# Patient Record
Sex: FEMALE | NOT HISPANIC OR LATINO | Employment: FULL TIME | URBAN - METROPOLITAN AREA
[De-identification: names, ages, dates, MRNs, and addresses within clinical notes are randomized per-mention and may not be internally consistent; named-entity substitution may affect disease eponyms.]

---

## 2020-09-18 ENCOUNTER — TRANSCRIBE ORDERS (OUTPATIENT)
Dept: ADMINISTRATIVE | Facility: HOSPITAL | Age: 55
End: 2020-09-18

## 2020-09-18 DIAGNOSIS — K59.01 SLOW TRANSIT CONSTIPATION: Primary | ICD-10-CM

## 2020-09-24 ENCOUNTER — TRANSCRIBE ORDERS (OUTPATIENT)
Dept: PULMONOLOGY | Facility: HOSPITAL | Age: 55
End: 2020-09-24

## 2020-09-24 ENCOUNTER — HOSPITAL ENCOUNTER (OUTPATIENT)
Dept: GENERAL RADIOLOGY | Facility: HOSPITAL | Age: 55
Discharge: HOME | End: 2020-09-24
Admitting: COLON & RECTAL SURGERY

## 2020-09-24 DIAGNOSIS — K59.01 SLOW TRANSIT CONSTIPATION: ICD-10-CM

## 2020-09-24 DIAGNOSIS — G47.33 OBSTRUCTIVE SLEEP APNEA (ADULT) (PEDIATRIC): Primary | ICD-10-CM

## 2020-09-24 PROCEDURE — 0 DIATRIZOATE MEGLUMINE & SODIUM PER 1 ML: Performed by: COLON & RECTAL SURGERY

## 2020-09-24 PROCEDURE — 74270 X-RAY XM COLON 1CNTRST STD: CPT

## 2020-09-24 RX ADMIN — DIATRIZOATE MEGLUMINE AND DIATRIZOATE SODIUM 480 ML: 660; 100 LIQUID ORAL; RECTAL at 13:21

## 2020-10-22 ENCOUNTER — HOSPITAL ENCOUNTER (OUTPATIENT)
Dept: SLEEP MEDICINE | Facility: HOSPITAL | Age: 55
Discharge: HOME | End: 2020-10-22
Admitting: INTERNAL MEDICINE

## 2020-10-22 VITALS — HEIGHT: 62 IN | BODY MASS INDEX: 29.44 KG/M2 | WEIGHT: 160 LBS

## 2020-10-22 DIAGNOSIS — G47.33 OBSTRUCTIVE SLEEP APNEA (ADULT) (PEDIATRIC): ICD-10-CM

## 2020-10-22 PROCEDURE — 95800 SLP STDY UNATTENDED: CPT

## 2024-05-08 ENCOUNTER — TRANSCRIBE ORDERS (OUTPATIENT)
Age: 59
End: 2024-05-08
Payer: COMMERCIAL

## 2024-05-08 DIAGNOSIS — M79.7 FIBROMYALGIA: Primary | ICD-10-CM

## 2024-05-08 DIAGNOSIS — M79.10 MUSCLE PAIN: ICD-10-CM

## 2024-05-08 DIAGNOSIS — M25.50 ARTHRALGIA, UNSPECIFIED JOINT: ICD-10-CM

## 2024-05-29 PROBLEM — M79.7 FIBROMYALGIA: Status: ACTIVE | Noted: 2024-05-29

## 2024-05-30 ENCOUNTER — OFFICE VISIT (OUTPATIENT)
Age: 59
End: 2024-05-30
Payer: COMMERCIAL

## 2024-05-30 VITALS
SYSTOLIC BLOOD PRESSURE: 120 MMHG | HEART RATE: 78 BPM | WEIGHT: 190.3 LBS | BODY MASS INDEX: 35.02 KG/M2 | HEIGHT: 62 IN | TEMPERATURE: 98.6 F | DIASTOLIC BLOOD PRESSURE: 88 MMHG

## 2024-05-30 DIAGNOSIS — M54.50 CHRONIC MIDLINE LOW BACK PAIN WITHOUT SCIATICA: ICD-10-CM

## 2024-05-30 DIAGNOSIS — E53.8 VITAMIN B 12 DEFICIENCY: ICD-10-CM

## 2024-05-30 DIAGNOSIS — I73.00 RAYNAUD'S DISEASE WITHOUT GANGRENE: ICD-10-CM

## 2024-05-30 DIAGNOSIS — M35.00 SJOGREN'S SYNDROME, WITH UNSPECIFIED ORGAN INVOLVEMENT: ICD-10-CM

## 2024-05-30 DIAGNOSIS — G89.29 CHRONIC MIDLINE LOW BACK PAIN WITHOUT SCIATICA: ICD-10-CM

## 2024-05-30 DIAGNOSIS — M79.7 FIBROMYALGIA: Primary | ICD-10-CM

## 2024-05-30 DIAGNOSIS — M25.50 ARTHRALGIA, UNSPECIFIED JOINT: ICD-10-CM

## 2024-05-30 DIAGNOSIS — E55.9 VITAMIN D DEFICIENCY: ICD-10-CM

## 2024-05-30 RX ORDER — VALACYCLOVIR HYDROCHLORIDE 500 MG/1
1000 TABLET, FILM COATED ORAL 2 TIMES DAILY
COMMUNITY

## 2024-05-30 RX ORDER — LOSARTAN POTASSIUM 100 MG/1
100 TABLET ORAL DAILY
COMMUNITY

## 2024-05-30 RX ORDER — CLONIDINE HYDROCHLORIDE 0.1 MG/1
0.1 TABLET ORAL AS NEEDED
COMMUNITY

## 2024-05-30 RX ORDER — HYDROCORTISONE ACETATE 25 MG/1
25 SUPPOSITORY RECTAL AS NEEDED
COMMUNITY

## 2024-05-30 RX ORDER — FAMOTIDINE 40 MG/1
40 TABLET, FILM COATED ORAL DAILY
COMMUNITY

## 2024-05-30 NOTE — ASSESSMENT & PLAN NOTE
Diagnosed in 2015 [facility]  Positive Fatigue. Vitamin B-12 Dificiency.   Diffused Muscle Disease  Sleep- 4-5 hours per night, she has trouble falling asleep and staying asleep. Sleep Apnea Evaluation was negative (2020 watch test). She does wake herself gasping for air and snoring, snoring and snores severely.  Medications tried, Paxil, Prozac, Zoloft, Cevela, Lexapro, Effexor,Wellbutrin,Cymbalta naproxen, meloxicam, Celebrex, diclofenac, Piroxicam, etodolac, amitriptyline, trazodone, flexeril, tizanidine ,baclofen, tramadol, lyrica (caused side effects), Gabapentin (swelling).  She walks on lunch, stretches and uses the elliptical for exercise.   Chronic Muscle Pain, Fatigue, Poor Sleep. Significant Symptoms consistent with Sleep Apnea. Pt. Has associated IBS, Pelvic Pain and Interstitial Cystitis      Plan:    Sleep Clinic Evaluation  Four Cornerstones of treatment include:  1 - Treat sleep - restoration  2 - Exercise - chair aerobics or chair yoga - You tube, Water aerobics  3 - Treat anxiety and depression  4 - Treat pain. Non addictive muscle relaxer Biofreeze massage, heat, ice   Symptoms can be worsened when thyroid is underactive. B12 and D are low. This was the case in 12/24.  CBC/CMP, CK, TSH

## 2024-05-30 NOTE — ASSESSMENT & PLAN NOTE
She does not use NSAIDS due to her gastritis. She does have some symptoms that can be concerning with Inflammatory Arthritis, MCP joints, can be related to Sjogren's. However would revaluated Lupus and Rheumatoid, Sjorgren's can be associated with either.     Plan:  RF3, CCP, Sed Rate, CRP  Restart Plaquneil, she has been cleared for this previously.  Take 1 per day for 3 weeks, then increase to 1 tab twice daily   Tylenol Arthritis 2 tablets twice dailys as needed - [employer] generic or BRAND

## 2024-05-30 NOTE — ASSESSMENT & PLAN NOTE
Primarily blue color changes.    Plan  Conservative Measures- gloves and socks, hand warmers.   Calcium Channel Blockers- at this point symptoms are occasional and resolve with warming. Will observe.

## 2024-05-30 NOTE — ASSESSMENT & PLAN NOTE
With rediucular symptoms in left leg.  Worsening low back pain, X 1 year. Radiates to left foot. Positive numbness. Mild decreased in quad strength. Absent reflex, left knee.       Plan:  MRI of the Lumbar Spine, with Ref to Neuro Surgery in the future.

## 2024-05-30 NOTE — PROGRESS NOTES
[facility] Office Visit     Office Visit       Date: 05/30/2024   Patient Name: Hilary Oh  MRN: [id number]  YOB: 1965    Referring Physician: David Valles,*     Chief Complaint   Patient presents with    sjogrens    Fibromyalgia       History of Present Illness: Hilary Oh is a 58 y.o. female who is here today for evaluation of Fibromyalgia, muscle pain, and joint pain. The patient was sent by Dr. David Valles. Per the pt.'s intake form, she has difficulty walking up a flight of stairs and cleaning her house. Her pain level today is 5/10.    Per patient she was diagnosed with Sjogren's Syndrome via lip bioposy.   She was seen by us in Sept and Oct of 2020.   1.) Fibromyalgia -diagnosed in 2015 at [facility]. Positive Fatigue. Vitamin B-12 Dificiency.   Diffused Muscle Disease  Sleep- 4-5 hours per night, she has trouble falling asleep and staying asleep. Sleep Apnea Evaluation was negative (2020 watch test). She does wake herself gasping for air and snoring, snoring and snores severely.  Medications tried, Paxil, Prozac, Zoloft, Cevela, Lexapro, Effexor,Wellbutrin,Cymbalta naproxen, meloxicam, Celebrex, diclofenac, Piroxicam, etodolac, amitriptyline, trazodone, flexeril, tizanidine ,baclofen, tramadol, lyrica (caused side effects), Gabapentin (swelling).  She walks on lunch, stretches and uses the elliptical for exercise.   2.) Sjogren's - DX by lip biopsy in 2015, Positive MARK 1:640 H, Neg SSA/SSB, Positive Dry eyes, mouth and vaginal dryness. She uses Xylimelts and Biotene.  3.) Raynauds- blue discoloration of finger tips when cold. Denies the classic 3 color change.   4.) Joint Pain: Knees, MCP pain, 1 hour morning stiffness. The knees ache. Knees and MCP's swell.  She's never tried Plaquneil.  No diagnoses of psoriasis, IBS, Chlamydia  food poisoning. Denies blood transfusions. No tattoos    She's been diagnosed with  IBS. Occasional blurred vision and pulsating of the eyes.     Lupus Symptoms:   Denies : Pleurisy, Sicca, Synovitis   Positive for painful nasal and oral uclers often. She gets a flushing type rash in the sun that burns.       Result Review :        Data reviewed : PCP notes.         Subjective     Allergies   Allergen Reactions    Amitriptyline Hcl Provider Review Needed    Bupropion Hcl [Bupropion] Provider Review Needed    Ezetimibe Provider Review Needed    Sumatriptan Provider Review Needed         Current Outpatient Medications:     albuterol sulfate  (90 Base) MCG/ACT inhaler, Inhale 2 puffs Every 4 (Four) Hours As Needed for Wheezing., Disp: , Rfl:     ALPRAZolam (XANAX) 0.5 MG tablet, Take 1 tablet by mouth 2 (Two) Times a Day As Needed for Anxiety. Take 1 tablet in the am and 2 tablets at bedtime, Disp: , Rfl:     cloNIDine (CATAPRES) 0.1 MG tablet, Take 1 tablet by mouth As Needed for High Blood Pressure., Disp: , Rfl:     Cyanocobalamin (B-12 Compliance Injection) 1000 MCG/ML kit, Inject  as directed Every 30 (Thirty) Days. Every month, Disp: , Rfl:     estradiol (CLIMARA) 0.1 MG/24HR patch, Place 1 patch on the skin as directed by provider 1 (One) Time Per Week. 2 times every week, Disp: , Rfl:     famotidine (PEPCID) 40 MG tablet, Take 1 tablet by mouth Daily., Disp: , Rfl:     hydrocortisone (Anucort-HC) 25 MG suppository, Insert 1 suppository into the rectum As Needed for Hemorrhoids., Disp: , Rfl:     levothyroxine sodium (Tirosint) 75 MCG capsule, Take 1 capsule by mouth Daily., Disp: , Rfl:     losartan (COZAAR) 100 MG tablet, Take 1 tablet by mouth Daily., Disp: , Rfl:     montelukast (SINGULAIR) 10 MG tablet, Take 1 tablet by mouth. Take 1 tablet by oral route every day in the evening  1 tablet in the am and 2 at bedtime, Disp: , Rfl:     nystatin (MYCOSTATIN) 856429 UNIT/GM cream, Apply 1 Application topically to the appropriate area as directed 2 (Two) Times a Day., Disp: , Rfl:      omeprazole (priLOSEC) 40 MG capsule, Take 1 capsule by mouth Daily. Before a meal, Disp: , Rfl:     phenazopyridine (PYRIDIUM) 200 MG tablet, Take 1 tablet by mouth 3 (Three) Times a Day As Needed for Bladder Spasms. After meals as needed, Disp: , Rfl:     valACYclovir (VALTREX) 500 MG tablet, Take 2 tablets by mouth 2 (Two) Times a Day., Disp: , Rfl:     Vitamin D, Ergocalciferol, 06948 units capsule, Take 1 capsule by mouth 1 (One) Time Per Week., Disp: , Rfl:     Past Medical History:   Diagnosis Date    Acne     Allergic rhinitis     Altered bowel habits     Anemia     Pernicious/ B-12    Anxiety     Arthritis     Asthma     Burn of pharynx     Burning mouth syndrome     Chest pain     Negative Eval 2017    Chronic chest pain     Chronic constipation     Chronic cough     Chronic fatigue syndrome     Chronic idiopathic constipation     2020    Chronic interstitial cystitis     DDD (degenerative disc disease), cervical     DDD (degenerative disc disease), lumbar     Diverticulosis     Dyspnea     Fatty liver     Fibromyalgia     Gastritis     GERD (gastroesophageal reflux disease)     Headache, migraine     Hemorrhoid     Hyperlipidemia     Hypothyroidism     Insomnia     Internal hemorrhoids     Irritable bowel disease     Keratoconjunctivitis sicca, in Sjogren's syndrome     Kidney stones     Loss of hair     Meralgia paresthetica, left lower limb     2022    Microscopic hematuria     Mood disorder     Musculoskeletal abnormal finding on examination     Neck pain     Night sweats     Ovarian cyst     Paresthesia     Peripheral neuropathy     Pilar cyst     Rectocele     Incomplete Uterovaginal Prolapse 2024    Shoulder impingement     Left    Sjogren's syndrome     Tinnitus     Ureteral stricture     Dilation and Stent 2013    Urethral syndrome     Varicose vein of leg     Vitamin B 12 deficiency     Vitamin D deficiency     Xerostomia         Past Surgical History:   Procedure Laterality Date    BREAST BIOPSY       COLONOSCOPY      HYSTERECTOMY      Still Has Overies 2006    LABIAPLASTY      2016    RADICAL HYSTERECTOMY      ROTATOR CUFF REPAIR Right     Bicep    TUBAL ABDOMINAL LIGATION         Family History   Problem Relation Age of Onset    Arthritis Mother     Osteoporosis Mother     Cervical cancer Mother     Depression Mother     Lung cancer Mother     Heart attack Father     Hypertension Father     Hyperlipidemia Father     Heart disease Father     Diabetes Sister         Social History     Socioeconomic History    Marital status:    Tobacco Use    Smoking status: Never     Passive exposure: Past    Smokeless tobacco: Never   Substance and Sexual Activity    Alcohol use: Not Currently    Drug use: Never    Sexual activity: Defer       Review of Systems   Constitutional:  Positive for chills, fatigue and unexpected weight gain. Negative for fever.        Night sweats   HENT:  Positive for mouth sores, rhinorrhea, tinnitus and trouble swallowing. Negative for nosebleeds and swollen glands.         Dry mouth, dry eyes, jaw pain, nasal sores, red eyes, sore throat   Eyes:  Positive for blurred vision and pain. Negative for double vision, photophobia and visual disturbance.   Respiratory:  Positive for shortness of breath and wheezing. Negative for apnea, cough and choking.    Cardiovascular:  Positive for palpitations and leg swelling. Negative for chest pain.        Leg cramping, raynauds, varicose veins    Gastrointestinal:  Positive for abdominal pain, blood in stool, constipation, nausea and GERD. Negative for diarrhea and vomiting.        Abdominal cramping, bloating, early satiety, hemorrhoids, epigastric pain   Endocrine: Positive for cold intolerance, heat intolerance, polydipsia and polyuria. Negative for polyphagia.        Nocturia, pelvic pain, hair loss, facial hair, hot flashes    Genitourinary:  Positive for pelvic pain and urinary incontinence. Negative for difficulty urinating, dysuria, genital  "sores and hematuria.        Kidney stones   Musculoskeletal:  Positive for back pain, joint swelling, myalgias, neck pain and neck stiffness. Negative for arthralgias, gait problem and bursitis.        Joint pain, joint tenderness, low back pain, muscle cramping, morning stiffness    Skin:  Positive for rash (facial lupus rash) and bruise.        Hives, gabriela changes, scalp tenderness    Allergic/Immunologic: Negative for environmental allergies and food allergies.   Neurological:  Positive for dizziness, weakness, numbness, headache and memory problem. Negative for tremors, seizures, syncope and confusion.        Tingling    Hematological:  Negative for adenopathy. Bruises/bleeds easily.   Psychiatric/Behavioral:  Positive for sleep disturbance. Negative for suicidal ideas, depressed mood and stress. The patient is nervous/anxious.         I reviewed the patient's chief complaint, history of present illness, review of systems, past medical history, surgical history, family history, social history, medications and allergy list.     Objective    Vital Signs:   Vitals:    05/30/24 0914   BP: 120/88   Pulse: 78   Temp: 98.6 °F (37 °C)   Weight: 86.3 kg (190 lb 4.8 oz)   Height: 157.5 cm (62\")     Hilary Oh reports a pain score of .  Given her pain assessment as noted, treatment options were discussed and the following options were decided upon as a follow-up plan to address the patient's pain: continuation of current treatment plan for pain.      Body mass index is 34.81 kg/m².   BMI cannot be calculated due to outdated height or weight values.  Please input a current height/weight in Vitals and re-renter BMIFOLLOWUP in Note to pull in correct documentation based on BMI range.         Physical Exam  Constitutional:       General: She is not in acute distress.     Appearance: She is not ill-appearing, toxic-appearing or diaphoretic.   HENT:      Head:      Comments: Tongue dry      Right Ear: External ear " normal.      Left Ear: External ear normal.      Nose: Nose normal. No congestion.      Mouth/Throat:      Pharynx: Oropharynx is clear. No oropharyngeal exudate or posterior oropharyngeal erythema.   Eyes:      Extraocular Movements: Extraocular movements intact.      Conjunctiva/sclera: Conjunctivae normal.      Pupils: Pupils are equal, round, and reactive to light.   Neck:      Comments: Mild decreased ROM in the neck.   Cardiovascular:      Rate and Rhythm: Normal rate and regular rhythm.      Pulses: Normal pulses.      Heart sounds: Normal heart sounds.   Pulmonary:      Effort: Pulmonary effort is normal.      Breath sounds: Normal breath sounds.   Abdominal:      General: Abdomen is flat. Bowel sounds are normal.      Palpations: Abdomen is soft.   Musculoskeletal:         General: Normal range of motion.      Right shoulder: Tenderness present.      Left shoulder: Tenderness present.      Comments: FMS 18/18 tender points positive.     Tender in Lumbar and Thoracic    MCPs, PIPs Tender    Strength 4/5 left leg   Skin:     General: Skin is warm and dry.      Capillary Refill: Capillary refill takes less than 2 seconds.   Neurological:      General: No focal deficit present.      Mental Status: She is oriented to person, place, and time.      Cranial Nerves: No cranial nerve deficit.      Motor: No weakness.      Deep Tendon Reflexes: Reflexes normal.   Psychiatric:         Mood and Affect: Mood normal.         Behavior: Behavior normal.         Thought Content: Thought content normal.            Physical Exam  There is currently no information documented on the homunculus. Go to the Rheumatology activity and complete the homunculus joint exam.       Results Review:   Imaging Results (Last 24 Hours)       ** No results found for the last 24 hours. **            Procedures    Assessment / Plan    Assessment/Plan:   Diagnoses and all orders for this visit:    1. Fibromyalgia (Primary)  Assessment &  Plan:  Diagnosed in 2015 [facility]  Positive Fatigue. Vitamin B-12 Dificiency.   Diffused Muscle Disease  Sleep- 4-5 hours per night, she has trouble falling asleep and staying asleep. Sleep Apnea Evaluation was negative (2020 watch test). She does wake herself gasping for air and snoring, snoring and snores severely.  Medications tried, Paxil, Prozac, Zoloft, Cevela, Lexapro, Effexor,Wellbutrin,Cymbalta naproxen, meloxicam, Celebrex, diclofenac, Piroxicam, etodolac, amitriptyline, trazodone, flexeril, tizanidine ,baclofen, tramadol, lyrica (caused side effects), Gabapentin (swelling).  She walks on lunch, stretches and uses the elliptical for exercise.   Chronic Muscle Pain, Fatigue, Poor Sleep. Significant Symptoms consistent with Sleep Apnea. Pt. Has associated IBS, Pelvic Pain and Interstitial Cystitis      Plan:    Sleep Clinic Evaluation  Four Cornerstones of treatment include:  1 - Treat sleep - restoration  2 - Exercise - chair aerobics or chair yoga - You tube, Water aerobics  3 - Treat anxiety and depression  4 - Treat pain. Non addictive muscle relaxer Biofreeze massage, heat, ice   Symptoms can be worsened when thyroid is underactive. B12 and D are low. This was the case in 12/24.  CBC/CMP, CK, TSH      2. Sjogren's syndrome, with unspecified organ involvement  Assessment & Plan:  DX by lip biopsy in 2015, Positive MARK 1:640 H, Neg SSA/SSB, Positive Dry eyes, mouth and vaginal dryness. She uses Xylimelts and Biotene.  She cannot take Pilocarpine of Cevelimine due to Asthma.    Plan:  ACT or Biotene toothpaste  Xylimelts at bedtime. Oasis Spray-[employer]. Drink plenty of fluids. Regular Dental and Eye Exams.   MARK by IFA, SSA/SSB, Chappell, RNP, Crithidia DNA    Orders:  -     MARK by IFA, Reflex 9-biomarkers profile; Future  -     CBC & Differential; Future  -     Comprehensive Metabolic Panel; Future  -     CK; Future  -     XR Spine Lumbar 2 or 3 View; Future  -     MRI Lumbar Spine Without Contrast;  Future  -     Sedimentation Rate; Future  -     C-reactive Protein; Future  -     Sjogren's Antibody, Anti-SS-A / -SS-B; Future  -     TSH; Future  -     Anti-Smith Antibody; Future  -     Cyclic Citrul Peptide Antibody, IgG / IgA; Future  -     Anti-U1 RNP Ab (RDL); Future  -     dsDNA Antibody by IFA, Crithidia luciliae, with Reflex to Titer; Future  -     RF Isotypes, IgG, IgA, IgM EIA; Future    3. Raynaud's disease without gangrene  Assessment & Plan:  Primarily blue color changes.    Plan  Conservative Measures- gloves and socks, hand warmers.   Calcium Channel Blockers- at this point symptoms are occasional and resolve with warming. Will observe.     Orders:  -     MARK by IFA, Reflex 9-biomarkers profile; Future  -     CBC & Differential; Future  -     Comprehensive Metabolic Panel; Future  -     CK; Future  -     XR Spine Lumbar 2 or 3 View; Future  -     MRI Lumbar Spine Without Contrast; Future  -     Sedimentation Rate; Future  -     C-reactive Protein; Future  -     Sjogren's Antibody, Anti-SS-A / -SS-B; Future  -     TSH; Future  -     Anti-Smith Antibody; Future  -     Cyclic Citrul Peptide Antibody, IgG / IgA; Future  -     Anti-U1 RNP Ab (RDL); Future  -     dsDNA Antibody by IFA, Crithidia luciliae, with Reflex to Titer; Future  -     RF Isotypes, IgG, IgA, IgM EIA; Future    4. Arthralgia, unspecified joint  Assessment & Plan:  She does not use NSAIDS due to her gastritis. She does have some symptoms that can be concerning with Inflammatory Arthritis, MCP joints, can be related to Sjogren's. However would revaluated Lupus and Rheumatoid, Sjorgren's can be associated with either.     Plan:  RF3, CCP, Sed Rate, CRP  Restart Plaquneil, she has been cleared for this previously.  Take 1 per day for 3 weeks, then increase to 1 tab twice daily   Tylenol Arthritis 2 tablets twice dailys as needed - [employer] generic or BRAND        Orders:  -     MARK by IFA, Reflex 9-biomarkers profile; Future  -     CBC &  Differential; Future  -     Comprehensive Metabolic Panel; Future  -     CK; Future  -     XR Spine Lumbar 2 or 3 View; Future  -     MRI Lumbar Spine Without Contrast; Future  -     Sedimentation Rate; Future  -     C-reactive Protein; Future  -     Sjogren's Antibody, Anti-SS-A / -SS-B; Future  -     TSH; Future  -     Anti-Smith Antibody; Future  -     Cyclic Citrul Peptide Antibody, IgG / IgA; Future  -     Anti-U1 RNP Ab (RDL); Future  -     dsDNA Antibody by IFA, Crithidia luciliae, with Reflex to Titer; Future  -     RF Isotypes, IgG, IgA, IgM EIA; Future    5. Vitamin D deficiency  Assessment & Plan:  She takes 50,000iu once weekly    Plan  Check Vitamin D today.in view of Fibro and fatigue       6. Vitamin B 12 deficiency  Assessment & Plan:  Can contribute to Fatigue, which is also seen in fibro.    Plan  Check B12 today      7. Chronic midline low back pain without sciatica  Assessment & Plan:  With rediucular symptoms in left leg.  Worsening low back pain, X 1 year. Radiates to left foot. Positive numbness. Mild decreased in quad strength. Absent reflex, left knee.               I spent 60 minutes caring for Hilary on this date of service. This time includes time spent by me in the following activities: Complicated patient, with multiple medical issues, complicated PMH. Greater than 50% discussion with patient regarding reevaluation of CTD, treatment plan with medication and assessment of her low back pain with radicular symptoms.     Follow Up:   Return in about 8 weeks (around 7/25/2024).      Scribed for Ce Mazariegos MD by Greer Justice RN. 5/30/2024  10:56 EDT      Ce Mazariegos MD  [facility] [place] [facility]

## 2024-05-30 NOTE — ASSESSMENT & PLAN NOTE
DX by lip biopsy in 2015, Positive MARK 1:640 H, Neg SSA/SSB, Positive Dry eyes, mouth and vaginal dryness. She uses Xylimelts and Biotene.  She cannot take Pilocarpine of Cevelimine due to Asthma.    Plan:  ACT or Biotene toothpaste  Xylimelts at bedtime. Oasis Spray-[employer]. Drink plenty of fluids. Regular Dental and Eye Exams.   MARK by IFA, SSA/SSB, JOSE F Chappell, Ryland DNA

## 2024-08-02 ENCOUNTER — OFFICE VISIT (OUTPATIENT)
Age: 59
End: 2024-08-02
Payer: COMMERCIAL

## 2024-08-02 VITALS
DIASTOLIC BLOOD PRESSURE: 82 MMHG | HEART RATE: 72 BPM | TEMPERATURE: 98.1 F | BODY MASS INDEX: 34.82 KG/M2 | HEIGHT: 62 IN | SYSTOLIC BLOOD PRESSURE: 140 MMHG | WEIGHT: 189.2 LBS

## 2024-08-02 DIAGNOSIS — E53.8 VITAMIN B 12 DEFICIENCY: ICD-10-CM

## 2024-08-02 DIAGNOSIS — R10.9 ABDOMINAL PAIN, UNSPECIFIED ABDOMINAL LOCATION: ICD-10-CM

## 2024-08-02 DIAGNOSIS — R53.83 OTHER FATIGUE: ICD-10-CM

## 2024-08-02 DIAGNOSIS — M25.50 ARTHRALGIA, UNSPECIFIED JOINT: ICD-10-CM

## 2024-08-02 DIAGNOSIS — M79.7 FIBROMYALGIA: ICD-10-CM

## 2024-08-02 DIAGNOSIS — G89.29 CHRONIC MIDLINE LOW BACK PAIN WITH LEFT-SIDED SCIATICA: ICD-10-CM

## 2024-08-02 DIAGNOSIS — L65.9 HAIR LOSS: ICD-10-CM

## 2024-08-02 DIAGNOSIS — R76.8 POSITIVE ANA (ANTINUCLEAR ANTIBODY): ICD-10-CM

## 2024-08-02 DIAGNOSIS — Z79.899 HIGH RISK MEDICATION USE: ICD-10-CM

## 2024-08-02 DIAGNOSIS — M54.42 CHRONIC MIDLINE LOW BACK PAIN WITH LEFT-SIDED SCIATICA: ICD-10-CM

## 2024-08-02 DIAGNOSIS — M35.00 SJOGREN'S SYNDROME, WITH UNSPECIFIED ORGAN INVOLVEMENT: Primary | ICD-10-CM

## 2024-08-02 RX ORDER — PROMETHAZINE HYDROCHLORIDE 12.5 MG/1
12.5 TABLET ORAL EVERY 6 HOURS PRN
COMMUNITY
Start: 2024-06-07

## 2024-08-02 RX ORDER — HYDROXYCHLOROQUINE SULFATE 200 MG/1
200 TABLET, FILM COATED ORAL 2 TIMES DAILY
Qty: 60 TABLET | Refills: 4 | Status: SHIPPED | OUTPATIENT
Start: 2024-08-02 | End: 2025-03-13

## 2024-08-02 RX ORDER — METHOCARBAMOL 750 MG/1
TABLET, FILM COATED ORAL
Qty: 90 TABLET | Refills: 3 | Status: SHIPPED | OUTPATIENT
Start: 2024-08-02 | End: 2025-03-13

## 2024-08-02 RX ORDER — THYROID,PORK 60 MG
60 TABLET ORAL DAILY
COMMUNITY
Start: 2024-06-27

## 2024-08-02 RX ORDER — CEVIMELINE HYDROCHLORIDE 30 MG/1
30 CAPSULE ORAL 3 TIMES DAILY
Qty: 90 CAPSULE | Refills: 3 | Status: SHIPPED | OUTPATIENT
Start: 2024-08-02 | End: 2025-03-13

## 2024-08-02 NOTE — ASSESSMENT & PLAN NOTE
S/p Paxil, Prozac, Zoloft, Celexa, Lexapro, Effexor, Wellbutrin, Cymbalta. Paxil may have helped some.  S/p Naproxen, meloxicam, celebrex, diclofenac, Feldene, Etodolac, amitriptyline, trazodone.  S/p flexeril, tizanidine, baclofen, Tramadol- no relief. S/p Lyrica - SE's, Gabapentin - swelling.  She has been diagnosed with FMS for quite some time.  FMS packet given 9/10/2020 for her to read and take to pcp for treatment plan.  Patient saw Dr. Horta for sleep apnea eval.  FMS sx are likely worsened secondary to hypothyroidism and Vitamin Deficiency both which need to be treated.      Plan:  Treat underlying anxiety and depression , pain , exercise and sleep restoration.  Exercise - water aerobics or walking, Chair yoga or chair aerobics on YouTube. Muscle relaxer such as Robaxin 500mg three times daily as needed.  Heat ice or massage.  Biofreeze, CBD oil or lotion as topical cream options.  Pt is an option if needed.

## 2024-08-02 NOTE — ASSESSMENT & PLAN NOTE
Diffuse joint pain with pip pain and swelling.   AM stiffness is 2 hours.  Rf3, CCP negative.  Part of her joint discomfort could be worsened by Hypothyroidism.    Plan:      Voltaren gel to joints if needed.  Plaquenil trial for joints - discussed , brochure given.

## 2024-08-02 NOTE — ASSESSMENT & PLAN NOTE
Diagnosed by lip biopsy 2015 - Path reports states although not definite features of Sjogren's, could represent early changes of Sjogren's in the right clinical setting.  Positive Deepali 1:640 (H)  Positive dry eye, vagina and mouth.  Negative Ssa/Ssb in past.    Plan:    Repeat Ssa/ssb; Deepali by IFA, Cbc, Cmp, UA.  ACT or Biotene toothpaste.  Xylimelts are an option for dry mouth.  Harwick spray from [employer].  There are two prescription meds that can treat this but with her history of Asthma I would avoid.  Continue OTX drops.

## 2024-08-02 NOTE — ASSESSMENT & PLAN NOTE
Plaquenil discussed - brochure given.    Plan:  Eye exam Q 9-12 months with annual OCT. Would need eye clearance before starting.

## 2024-08-02 NOTE — ASSESSMENT & PLAN NOTE
Etiology unknown at present.  Appt pending with derm.  Positive Hypothyroidism.    Plan:    Biotin vitamin.  Nioxin shampoo.  Treat Thyroid with pcp.

## 2024-08-02 NOTE — ASSESSMENT & PLAN NOTE
23.7 in 9/2020  Treating with prescription D.    Plan:    After prescription D I rec she take 2000IU of D3 gel caps daily.

## 2024-08-02 NOTE — ASSESSMENT & PLAN NOTE
Positive bloating, pain , bleeding and constipation.  Seeing Dr. Stacy Lopez.  Celiac negative, IBD negative.    Plan:      Can try elimination trials of certain foods - Gluten, dairy, sugar, night shade etc.  Autoimmune paleo diet.

## 2024-08-02 NOTE — PROGRESS NOTES
[facility] [place] Visit     Office Follow Up      Date: 08/02/2024   Patient Name: Hilary Oh  MRN: [id number]  YOB: 1965    Referring Physician: David Valles,*     Chief Complaint   Patient presents with    Fibromyalgia    Sjogren's syndrome       History of Present Illness: Hilary Oh is a 58 y.o. female who is here today for follow up on   History of Present Illness  The patient presents for evaluation of multiple medical concerns.    The patient reports experiencing severe muscle pain, which disrupts her sleep. She experiences morning stiffness lasting approximately 2 hours. She quantifies her pain as an 8 out of 10, localized primarily on the posterior aspect of her left thigh, which radiates to her foot. She describes the pain as aching and occasionally throbbing, with no associated numbness. She also reports generalized body soreness and pain upon palpation. She also reports pain and swelling in her MCP joints, as well as discomfort in both knees. She has attempted various treatments for her fibromyalgia, including Tylenol, tramadol, Paxil, Prozac, Flexeril, tizanidine, baclofen, and methocarbamol, all of which have not provided relief. She has also tried Tylenol Arthritis, Biofreeze, and Tiger [place]. She has not undergone physical therapy for her back.    The patient has a history of Sjogren's syndrome, which was confirmed by a lip biopsy.    Supplemental Information  She has pernicious anemia. She has mild asthma. She takes vitamin D3 gel capsules every Sunday.    FAMILY HISTORY  Her sister has Sjogren's syndrome.       Result Review :        Results  Laboratory Studies  TSH was 10. Rheumatoid factor 3 was negative. CBC was normal. Sed rate was 6. CMP was normal. CK was normal. C-reactive protein was normal. MARK was positive at 1:320. Secondary testing DNA, Chappell, RNP, SSA, SSB, Sjogren's antibodies, and  scleroderma were negative. CCP was negative. Vitamin D and B12 levels were normal.    Imaging  X-ray of the lower back showed mild degenerative changes with some mild to moderate spurring on the front of the spine.          Subjective     Allergies   Allergen Reactions    Amitriptyline Hcl Provider Review Needed    Bupropion Hcl [Bupropion] Provider Review Needed    Ezetimibe Provider Review Needed    Sumatriptan Provider Review Needed         Current Outpatient Medications:     albuterol sulfate  (90 Base) MCG/ACT inhaler, Inhale 2 puffs Every 4 (Four) Hours As Needed for Wheezing., Disp: , Rfl:     ALPRAZolam (XANAX) 0.5 MG tablet, Take 1 tablet by mouth 2 (Two) Times a Day As Needed for Anxiety. Take 1 tablet in the am and 2 tablets at bedtime, Disp: , Rfl:     Chandler Thyroid 60 MG tablet, Take 1 tablet by mouth Daily., Disp: , Rfl:     cloNIDine (CATAPRES) 0.1 MG tablet, Take 1 tablet by mouth As Needed for High Blood Pressure., Disp: , Rfl:     Cyanocobalamin (B-12 Compliance Injection) 1000 MCG/ML kit, Inject  as directed Every 30 (Thirty) Days. Every month, Disp: , Rfl:     estradiol (CLIMARA) 0.1 MG/24HR patch, Place 1 patch on the skin as directed by provider 1 (One) Time Per Week. 2 times every week, Disp: , Rfl:     famotidine (PEPCID) 40 MG tablet, Take 1 tablet by mouth Daily., Disp: , Rfl:     hydrocortisone (Anucort-HC) 25 MG suppository, Insert 1 suppository into the rectum As Needed for Hemorrhoids., Disp: , Rfl:     losartan (COZAAR) 100 MG tablet, Take 1 tablet by mouth Daily., Disp: , Rfl:     montelukast (SINGULAIR) 10 MG tablet, Take 1 tablet by mouth. Take 1 tablet by oral route every day in the evening  1 tablet in the am and 2 at bedtime, Disp: , Rfl:     nystatin (MYCOSTATIN) 847882 UNIT/GM cream, Apply 1 Application topically to the appropriate area as directed 2 (Two) Times a Day., Disp: , Rfl:     omeprazole (priLOSEC) 40 MG capsule, Take 1 capsule by mouth Daily. Before a meal,  Disp: , Rfl:     phenazopyridine (PYRIDIUM) 200 MG tablet, Take 1 tablet by mouth 3 (Three) Times a Day As Needed for Bladder Spasms. After meals as needed, Disp: , Rfl:     promethazine (PHENERGAN) 12.5 MG tablet, Take 1 tablet by mouth Every 6 (Six) Hours As Needed., Disp: , Rfl:     valACYclovir (VALTREX) 500 MG tablet, Take 2 tablets by mouth 2 (Two) Times a Day., Disp: , Rfl:     Vitamin D, Ergocalciferol, 89089 units capsule, Take 1 capsule by mouth 1 (One) Time Per Week., Disp: , Rfl:     levothyroxine sodium (Tirosint) 75 MCG capsule, Take 1 capsule by mouth Daily., Disp: , Rfl:     Past Medical History:   Diagnosis Date    Acne     Allergic rhinitis     Altered bowel habits     Anemia     Pernicious/ B-12    Anxiety     Arthritis     Asthma     Burn of pharynx     Burning mouth syndrome     Chest pain     Negative Eval 2017    Chronic chest pain     Chronic constipation     Chronic cough     Chronic fatigue syndrome     Chronic idiopathic constipation     2020    Chronic interstitial cystitis     DDD (degenerative disc disease), cervical     DDD (degenerative disc disease), lumbar     Diverticulosis     Dyspnea     Fatty liver     Fibromyalgia     Gastritis     GERD (gastroesophageal reflux disease)     Headache, migraine     Hemorrhoid     Hyperlipidemia     Hypothyroidism     Insomnia     Internal hemorrhoids     Irritable bowel disease     Keratoconjunctivitis sicca, in Sjogren's syndrome     Kidney stones     Loss of hair     Meralgia paresthetica, left lower limb     2022    Microscopic hematuria     Mood disorder     Musculoskeletal abnormal finding on examination     Neck pain     Night sweats     Ovarian cyst     Paresthesia     Peripheral neuropathy     Pilar cyst     Rectocele     Incomplete Uterovaginal Prolapse 2024    Shoulder impingement     Left    Sjogren's syndrome     Tinnitus     Ureteral stricture     Dilation and Stent 2013    Urethral syndrome     Varicose vein of leg     Vitamin B 12  deficiency     Vitamin D deficiency     Xerostomia         Past Surgical History:   Procedure Laterality Date    BREAST BIOPSY      COLONOSCOPY      HYSTERECTOMY      Still Has Overies 2006    LABIAPLASTY      2016    RADICAL HYSTERECTOMY      ROTATOR CUFF REPAIR Right     Bicep    TUBAL ABDOMINAL LIGATION         Family History   Problem Relation Age of Onset    Arthritis Mother     Osteoporosis Mother     Cervical cancer Mother     Depression Mother     Lung cancer Mother     Heart attack Father     Hypertension Father     Hyperlipidemia Father     Heart disease Father     Diabetes Sister         Social History     Socioeconomic History    Marital status:    Tobacco Use    Smoking status: Never     Passive exposure: Past    Smokeless tobacco: Never   Vaping Use    Vaping status: Never Used   Substance and Sexual Activity    Alcohol use: Not Currently    Drug use: Never    Sexual activity: Defer       Review of Systems   Constitutional:  Positive for chills and fatigue.   HENT:  Positive for mouth sores, sore throat and tinnitus.    Eyes:  Positive for blurred vision.   Respiratory:  Positive for shortness of breath.    Cardiovascular:  Positive for chest pain.   Gastrointestinal:  Positive for constipation and nausea.   Musculoskeletal:  Positive for joint swelling, neck pain and neck stiffness.   Skin:  Positive for rash.   Neurological:  Positive for dizziness, weakness, numbness, headache and memory problem.   Hematological:  Bruises/bleeds easily.   Psychiatric/Behavioral:  Positive for sleep disturbance. The patient is nervous/anxious.       I have reviewed and updated the patient's chief complaint, history of present illness, review of systems, past medical history, surgical history, family history, social history, medications and allergy list as appropriate.     Objective    Vital Signs:   Vitals:    08/02/24 1459   BP: 140/82   BP Location: Left arm   Patient Position: Sitting   Cuff Size: Adult  "  Pulse: 72   Temp: 98.1 °F (36.7 °C)   Weight: 85.8 kg (189 lb 3.2 oz)   Height: 157.5 cm (62.01\")   PainSc:   8     Hilary Oh reports a pain score of 8.  Given her pain assessment as noted, treatment options were discussed and the following options were decided upon as a follow-up plan to address the patient's pain: .      Body mass index is 34.6 kg/m².        Physical Exam   Physical Exam  Patient is an alert female, no acute distress.  Head is atraumatic, normocephalic with pupils equal and round. Extraocular muscles intact. Oropharynx is negative except for dry tongue.  Lungs are clear.  Heart rhythm is regular without rubs, gallops, or murmurs.  Cervical spine shows mild to moderate decreased range of motion. Thoracic and lumbar spine are tender. All fibromyalgia syndrome tender points are tender. Shoulders are tender. Right wrist, MCPs and PIPs are tender. Left wrist, MCPs, and PIPs are tender. Knees, ankles, and MTPs are not tender today.  Skin is negative for rash.    Physical Exam  There is currently no information documented on the homunculus. Go to the Rheumatology activity and complete the homunculus joint exam.     Results Review:   Imaging Results (Last 24 Hours)       ** No results found for the last 24 hours. **            Procedures    Assessment / Plan    Assessment/Plan:   Diagnoses and all orders for this visit:    1. Sjogren's syndrome, with unspecified organ involvement (Primary)  Assessment & Plan:  Diagnosed by lip biopsy 2015 - Path reports states although not definite features of Sjogren's, could represent early changes of Sjogren's in the right clinical setting.  Positive Deepali 1:640 (H)  Positive dry eye, vagina and mouth.  Negative Ssa/Ssb in past.    Plan:    Repeat Ssa/ssb; Deepali by IFA, Cbc, Cmp, UA.  ACT or Biotene toothpaste.  Xylimelts are an option for dry mouth.  [place] spray from [employer].  There are two prescription meds that can treat this but with her history of Asthma I " would avoid.  Continue OTX drops.        2. Fibromyalgia  Assessment & Plan:  S/p Paxil, Prozac, Zoloft, Celexa, Lexapro, Effexor, Wellbutrin, Cymbalta. Paxil may have helped some.  S/p Naproxen, meloxicam, celebrex, diclofenac, Feldene, Etodolac, amitriptyline, trazodone.  S/p flexeril, tizanidine, baclofen, Tramadol- no relief. S/p Lyrica - SE's, Gabapentin - swelling.  She has been diagnosed with FMS for quite some time.  FMS packet given 9/10/2020 for her to read and take to pcp for treatment plan.  Patient saw Dr. Horta for sleep apnea eval.  FMS sx are likely worsened secondary to hypothyroidism and Vitamin Deficiency both which need to be treated.      Plan:  Treat underlying anxiety and depression , pain , exercise and sleep restoration.  Exercise - water aerobics or walking, Chair yoga or chair aerobics on YouTube. Muscle relaxer such as Robaxin 500mg three times daily as needed.  Heat ice or massage.  Biofreeze, CBD oil or lotion as topical cream options.  Pt is an option if needed.      3. Other fatigue  Assessment & Plan:  Fatigue likely multifactorial.  Positive snoring and daytime somnolence.  TSH 9.83 consistent with hypothyroidism.  B12 684, D 23.7  Low D and Hypothyroidism can contribute to fatigue as well as Joint pain.    Plan:    Treat D and Thyroid.  Recommend she return to sleep doctor ? Study.      4. Arthralgia, unspecified joint  Assessment & Plan:  Diffuse joint pain with pip pain and swelling.   AM stiffness is 2 hours.  Rf3, CCP negative.  Part of her joint discomfort could be worsened by Hypothyroidism.    Plan:      Voltaren gel to joints if needed.  Plaquenil trial for joints - discussed , brochure given.        5. Abdominal pain, unspecified abdominal location  Assessment & Plan:  Positive bloating, pain , bleeding and constipation.  Seeing Dr. Stacy Lopez.  Celiac negative, IBD negative.    Plan:      Can try elimination trials of certain foods - Gluten, dairy, sugar, night  shade etc.  Autoimmune paleo diet.      6. Hair loss  Assessment & Plan:  Etiology unknown at present.  Appt pending with derm.  Positive Hypothyroidism.    Plan:    Biotin vitamin.  Nioxin shampoo.  Treat Thyroid with pcp.      7. Vitamin B 12 deficiency  Assessment & Plan:  23.7 in 9/2020  Treating with prescription D.    Plan:    After prescription D I rec she take 2000IU of D3 gel caps daily.      8. Positive MARK (antinuclear antibody)  Assessment & Plan:  23.7 in 9/2020  Treating with prescription D.    Plan:    After prescription D I rec she take 2000IU of D3 gel caps daily.      9. High risk medication use  Assessment & Plan:  Plaquenil discussed - brochure given.    Plan:  Eye exam Q 9-12 months with annual OCT. Would need eye clearance before starting.              Assessment & Plan  1. Fibromyalgia.  The patient's muscle pain is likely exacerbated by her hypothyroidism. Her CK test is negative, indicating no inflammatory muscle disease. The sedimentation rate and CRP levels are within the normal range. A prescription for methocarbamol 750, to be taken 0.5 to 1 tablet thrice daily as needed, has been provided. The patient is advised to combine Tylenol Arthritis with methocarbamol at bedtime, starting with 0.5 tablet in the morning, 0.5 tablet in the afternoon, and 1 tablet at bedtime. If necessary, the dosage can be increased to 1 tablet thrice daily. The patient is also advised to engage in chair aerobics or yoga on YouTube.    2. Sjogren's syndrome.  The patient's sister is on sivimilene and has good results with it. She is considering it, but due to her mild asthma, we have discussed caution today about this could potentially worsen asthma. If she has any kind of worsening, she needs to stop it right away, but we will try it 1 tablet 3 times a day.    3. Raynaud's.  Observation is planned until the colder weather begins.    4. Joint pain.  Upon rechecking her labs, her rheumatoid testing was negative.  She has a positive MARK at 1:320. The secondary serologies are negative. The patient is advised to resume Plaquenil, with a 4-month trial. She is to take 1 tablet daily for 3 weeks, then 1 tablet twice daily. Tylenol Arthritis, 2 tablets twice daily as needed, is prescribed.    5. Vitamin D deficiency.  The patient is to continue her 50,000 units of vitamin D weekly. If her vitamin D levels drop significantly, she is advised to take 4000 units of vitamin D daily.    6. B12 deficiency.  The patient is advised to continue her B12 injections.    7. Back pain.  Physical therapy has been ordered.    Follow-up  A follow-up appointment is scheduled for 4 months from now.        Follow Up:   No follow-ups on file.         Ce Mazariegos MD  [facility]     Encounter Administratively Closed by [employer]

## 2024-08-02 NOTE — ASSESSMENT & PLAN NOTE
Fatigue likely multifactorial.  Positive snoring and daytime somnolence.  TSH 9.83 consistent with hypothyroidism.  B12 684, D 23.7  Low D and Hypothyroidism can contribute to fatigue as well as Joint pain.    Plan:    Treat D and Thyroid.  Recommend she return to sleep doctor ? Study.

## 2024-08-27 ENCOUNTER — TELEPHONE (OUTPATIENT)
Age: 59
End: 2024-08-27

## 2024-11-25 ENCOUNTER — TELEPHONE (OUTPATIENT)
Age: 59
End: 2024-11-25
Payer: COMMERCIAL

## 2024-11-25 NOTE — TELEPHONE ENCOUNTER
PT HAD TO BE CANCELLED DUE TO A PROVIDER EMERGENCY. I HAVE REACHED OUT VIA [employer], [employer] AND PHONE. IF PT CALLS BACK TO RESCHEDULE, PLEASE SCHEDULE WITH SHEREEN MALDONADO OR FRANCIS. IF THE PT WANTS TO SEE DR. MEANS, SHE IS BOOKED UNTIL APRIL. IF IT IS A NEW PT, CONFRIM IF THEY ARE INTRESTED IN SEEING ANOTHER PROVIDER OR IF THEY WANT TO WAIT FOR DR. MEANS. PT HAS BEEN ADDED TO THE CANCELLATION LIST AS HIGH PRIORITY.         -HUB READY TO SCHEDULE.

## 2024-12-02 ENCOUNTER — TELEPHONE (OUTPATIENT)
Age: 59
End: 2024-12-02
Payer: COMMERCIAL

## 2024-12-02 DIAGNOSIS — M25.50 ARTHRALGIA, UNSPECIFIED JOINT: ICD-10-CM

## 2024-12-02 DIAGNOSIS — M79.7 FIBROMYALGIA: Primary | ICD-10-CM

## 2024-12-02 NOTE — TELEPHONE ENCOUNTER
PT WOULD LIKE SOMEONE TO REACH HER ABOUT THE MUSCLE RELAXERS SHE IS ON. SHE SAID SHE WOULD LIKE TO GO BACK ON TRAMADOL. HER APPT HAD TO BE CANCELLED AND SHE HAS BEEN RESCHEDULED FOR 3/13/2025 WITH [facility].     PLEASE REACH OUT TO PT VIA PHONE.

## 2024-12-03 RX ORDER — TRAMADOL HYDROCHLORIDE 50 MG/1
50 TABLET ORAL EVERY 8 HOURS PRN
Qty: 90 TABLET | Refills: 3 | Status: SHIPPED | OUTPATIENT
Start: 2024-12-03

## 2024-12-03 RX ORDER — TRAMADOL HYDROCHLORIDE 50 MG/1
50 TABLET ORAL EVERY 8 HOURS PRN
Qty: 90 TABLET | Refills: 3 | Status: SHIPPED | OUTPATIENT
Start: 2024-12-03 | End: 2024-12-03 | Stop reason: SDUPTHER

## 2024-12-03 NOTE — TELEPHONE ENCOUNTER
Contacted patient and informed her that Tramadol has been sent to [facility]. Patient states that she no longer uses that pharmacy and now uses [facility] in [place] and understands how she is to take the medications. Patient wants to be notified when medication has been sent.

## 2024-12-03 NOTE — TELEPHONE ENCOUNTER
Tramadol is not a muscle relaxer, it is a pain medication. She can continue to take both the methocarbamol and the tramadol as needed. I can call in the tramadol. She should ensure she only gets this medication from our office and take it as prescribed. Both tramadol and muscle relaxer can cause sleepiness/drowsiness. Tramadol can also cause respiratory depression, nausea/GI upset, constipation, etc. as it is an opioid.

## 2025-03-07 NOTE — ASSESSMENT & PLAN NOTE
Fatigue likely multifactorial.  Positive snoring and daytime somnolence.  TSH 9.83 consistent with hypothyroidism  B12 684, D 23.7    Low vitamin D and hypothyroidism can contribute to fatigue as well as joint pain.  Continue follow up for thyroid with PCP

## 2025-03-07 NOTE — PROGRESS NOTES
[facility] [place] [facility] [place] Visit     Office Follow Up      Date: 03/13/2025   Patient Name: Hilary Oh  MRN: [id number]  YOB: 1965    Referring Physician: No ref. provider found     Chief Complaint   Patient presents with    Follow-up    Sjogren's       History of Present Illness: Hilary Oh is a 59 y.o. female who is here today for follow up of Sjogren's.  History of Present Illness    She was last seen in the office by Dr. Mazariegos 9/2/2024.  The patient has a history of Sjogren's syndrome, which was confirmed by a lip biopsy.    Today she reports feeling the same.  She rates her pain 5/10.  She has 45 minutes a day of morning stiffness.  Since her last office visit, she restarted Plaquenil and did not see any improvement in her joint symptoms, so she stopped taking this.  Cevimeline was not covered by insurance.  She uses XyliMelts for her dry mouth.  She has been using an eye drop from her eye doctor that has been helpful.  She did not have any relief in her muscle pain with methocarbamol.  She reports there is a muscle relaxer she took after a surgical procedure in the past that did help her but she cannot recall the name.  She is going to try to find out what medication this was.  She continues to have low back pain with radiation to her left thigh, which radiates to her foot. She describes the pain as aching and occasionally throbbing, with associated numbness.  She has not done physical therapy yet.  Lumbar spine x-ray from June 2024 showed mild degenerative changes.  She has attempted various treatments for her fibromyalgia, all of which have not provided relief.   She has tried Tylenol Arthritis, Biofreeze, and Tiger Balm.     Supplemental Information  She has pernicious anemia. She has mild asthma. She takes vitamin D2 20394 IU every Sunday.    FAMILY HISTORY  Her sister has Sjogren's syndrome.       Subjective     Allergies    Allergen Reactions    Amitriptyline Hcl Provider Review Needed    Bupropion Hcl [Bupropion] Provider Review Needed    Ezetimibe Provider Review Needed    Sumatriptan Provider Review Needed         Current Outpatient Medications:     albuterol sulfate  (90 Base) MCG/ACT inhaler, Inhale 2 puffs Every 4 (Four) Hours As Needed for Wheezing., Disp: , Rfl:     ALPRAZolam (XANAX) 0.5 MG tablet, Take 1 tablet by mouth 2 (Two) Times a Day As Needed for Anxiety. Take 1 tablet in the am and 2 tablets at bedtime, Disp: , Rfl:     [place] Thyroid 60 MG tablet, Take 1 tablet by mouth Daily., Disp: , Rfl:     cloNIDine (CATAPRES) 0.1 MG tablet, Take 1 tablet by mouth As Needed for High Blood Pressure., Disp: , Rfl:     Cyanocobalamin (B-12 Compliance Injection) 1000 MCG/ML kit, Inject  as directed Every 30 (Thirty) Days. Every month, Disp: , Rfl:     estradiol (CLIMARA) 0.1 MG/24HR patch, Place 1 patch on the skin as directed by provider 1 (One) Time Per Week. 2 times every week, Disp: , Rfl:     famotidine (PEPCID) 40 MG tablet, Take 1 tablet by mouth Daily., Disp: , Rfl:     hydrocortisone (Anucort-HC) 25 MG suppository, Insert 1 suppository into the rectum As Needed for Hemorrhoids., Disp: , Rfl:     levothyroxine sodium (Tirosint) 75 MCG capsule, Take 1 capsule by mouth Daily., Disp: , Rfl:     losartan (COZAAR) 100 MG tablet, Take 1 tablet by mouth Daily., Disp: , Rfl:     montelukast (SINGULAIR) 10 MG tablet, Take 1 tablet by mouth. Take 1 tablet by oral route every day in the evening  1 tablet in the am and 2 at bedtime, Disp: , Rfl:     nystatin (MYCOSTATIN) 486778 UNIT/GM cream, Apply 1 Application topically to the appropriate area as directed 2 (Two) Times a Day., Disp: , Rfl:     omeprazole (priLOSEC) 40 MG capsule, Take 1 capsule by mouth Daily. Before a meal, Disp: , Rfl:     phenazopyridine (PYRIDIUM) 200 MG tablet, Take 1 tablet by mouth 3 (Three) Times a Day As Needed for Bladder Spasms. After meals as  needed, Disp: , Rfl:     promethazine (PHENERGAN) 12.5 MG tablet, Take 1 tablet by mouth Every 6 (Six) Hours As Needed., Disp: , Rfl:     psyllium (METAMUCIL) 0.52 g capsule, Take 1 capsule twice a day by oral route for 90 days., Disp: , Rfl:     traMADol (ULTRAM) 50 MG tablet, Take 1 tablet by mouth Every 8 (Eight) Hours As Needed for Moderate Pain., Disp: 90 tablet, Rfl: 3    valACYclovir (VALTREX) 500 MG tablet, Take 2 tablets by mouth 2 (Two) Times a Day., Disp: , Rfl:     Vitamin D, Ergocalciferol, 40946 units capsule, Take 1 capsule by mouth 1 (One) Time Per Week., Disp: 12 capsule, Rfl: 1    methylPREDNISolone (MEDROL) 4 MG dose pack, Take as directed on package instructions., Disp: 1 each, Rfl: 0    Past Medical History:   Diagnosis Date    Acne     Allergic rhinitis     Altered bowel habits     Anemia     Pernicious/ B-12    Anxiety     Arthritis     Asthma     Burn of pharynx     Burning mouth syndrome     Chest pain     Negative Eval 2017    Chronic chest pain     Chronic constipation     Chronic cough     Chronic fatigue syndrome     Chronic idiopathic constipation     2020    Chronic interstitial cystitis     DDD (degenerative disc disease), cervical     DDD (degenerative disc disease), lumbar     Diverticulosis     Dyspnea     Fatty liver     Fibromyalgia     Gastritis     GERD (gastroesophageal reflux disease)     Headache, migraine     Hemorrhoid     Hyperlipidemia     Hypothyroidism     Insomnia     Internal hemorrhoids     Irritable bowel disease     Keratoconjunctivitis sicca, in Sjogren's syndrome     Kidney stones     Loss of hair     Meralgia paresthetica, left lower limb     2022    Microscopic hematuria     Mood disorder     Musculoskeletal abnormal finding on examination     Neck pain     Night sweats     Ovarian cyst     Paresthesia     Peripheral neuropathy     Pilar cyst     Rectocele     Incomplete Uterovaginal Prolapse 2024    Shoulder impingement     Left    Sjogren's syndrome      Tinnitus     Ureteral stricture     Dilation and Stent 2013    Urethral syndrome     Varicose vein of leg     Vitamin B 12 deficiency     Vitamin D deficiency     Xerostomia         Past Surgical History:   Procedure Laterality Date    BREAST BIOPSY      COLONOSCOPY      HYSTERECTOMY      Still Has Overies 2006    LABIAPLASTY      2016    RADICAL HYSTERECTOMY      ROTATOR CUFF REPAIR Right     Bicep    TUBAL ABDOMINAL LIGATION         Family History   Problem Relation Age of Onset    Arthritis Mother     Osteoporosis Mother     Cervical cancer Mother     Depression Mother     Lung cancer Mother     Heart attack Father     Hypertension Father     Hyperlipidemia Father     Heart disease Father     Diabetes Sister         Social History     Socioeconomic History    Marital status:    Tobacco Use    Smoking status: Never     Passive exposure: Past    Smokeless tobacco: Never   Vaping Use    Vaping status: Never Used   Substance and Sexual Activity    Alcohol use: Not Currently    Drug use: Never    Sexual activity: Defer       Review of Systems positive for chills, sweating, fatigue, mouth sores, sinus pressure, sore throat, tinnitus, dysphagia, dry mouth, dry eyes, blurry vision, leg swelling, abdominal swelling and pain, anal bleeding, constipation, GERD, indigestion, rectal pain, cold and heat intolerance, excessive thirst and urination, pain during sex, joint pain and swelling, back pain, muscle pain, bruising, dry skin, hair loss, nail changes, Raynaud's, decreased concentration, anxiety, sleep disturbance, stress, headaches, numbness, weakness.  Otherwise negative ROS.    I have reviewed and updated the patient's chief complaint, history of present illness, review of systems, past medical history, surgical history, family history, social history, medications and allergy list as appropriate.     Objective    Vital Signs:   Vitals:    03/13/25 1114   BP: 130/80   BP Location: Left arm   Patient Position:  "Sitting   Cuff Size: Adult   Pulse: 69   Temp: 97.8 °F (36.6 °C)   Weight: 87.1 kg (192 lb)   Height: 157.5 cm (62.01\")   PainSc: 5    PainLoc: Generalized         Body mass index is 35.11 kg/m².  Defer to PCP      Physical Exam   Physical Exam  Patient is an alert female, no acute distress.  Head is atraumatic, normocephalic with pupils equal and round. Extraocular muscles intact. Oropharynx is negative except for dry tongue.  Respiratory effort is normal  Heart rhythm is regular   Cervical spine shows mild to moderate decreased range of motion. Thoracic and lumbar spine are tender. Fibromyalgia syndrome tender points are tender.   Decreased DTR left leg  Skin is negative for rash.      Assessment / Plan    Assessment/Plan:   Diagnoses and all orders for this visit:    1. Sjogren's syndrome, with unspecified organ involvement (Primary)  Assessment & Plan:  Diagnosed by lip biopsy 2015 - Path reports states although not definite features of Sjogren's, could represent early changes of Sjogren's in the right clinical setting.  Positive Mark 1:640 (H)  Positive dry eye, vagina, and mouth.  Negative Ssa/Ssb in past.  6/2024: MARK + 1:320 speckled, CCP negative, RF negative, dsDNA negative, Chappell and RNP negative, SSA/SSB negative, scleroderma antibody negative, CK normal, CRP and ESR normal  Previous Plaquenil use for about 2 years- no improvement    Overall stable  ACT or Biotene toothpaste.  Xylimelts are helpful  [place] spray from [employer].  Cevimeline was not covered by insurance  Continue OTC drops.  Lab order given today  RTC 6 months    Orders:  -     Comprehensive Metabolic Panel; Future  -     C-reactive Protein; Future  -     Sedimentation Rate; Future  -     CBC (No Diff); Future  -     Urinalysis without microscopic (no culture) - Urine, Clean Catch; Future    2. Positive MARK (antinuclear antibody)  Assessment & Plan:  Likely related to Sjogren's  No signs or symptoms of lupus today      3. High risk medication " use  Assessment & Plan:  No longer on Plaquenil- no change on medication    Orders:  -     Comprehensive Metabolic Panel; Future  -     C-reactive Protein; Future  -     Sedimentation Rate; Future  -     CBC (No Diff); Future    4. Arthralgia, unspecified joint  Assessment & Plan:  Hx diffuse joint pain with PIP pain and swelling.   AM stiffness is 45 minutes  Rf3, CCP negative.  Joint discomfort could be worsened by Hypothyroidism.    Voltaren gel to joints if needed.  Plaquenil trial was not helpful for joint  Tylenol arthritis  Continue tramadol as needed        5. Chronic midline low back pain without sciatica  Assessment & Plan:  With rediucular symptoms in left leg.  Worsening low back pain, X 1.5-2 years. Radiates to left foot. Positive numbness. Mild decreased in quad strength.  Decreased DTR left leg.  6/2024 lumbar spine film with mild degenerative changes    PT order given today  Medrol Dosepak given today  Consider MRI  Consider pain management versus neurosurgery referral    Orders:  -     Ambulatory Referral to Physical Therapy for Evaluation & Treatment    6. Raynaud's disease without gangrene  Assessment & Plan:  Primarily blue color changes.    Continue conservative Measures- gloves and socks, hand warmers      7. Fibromyalgia  Assessment & Plan:  S/p Paxil, Prozac, Zoloft, Celexa, Lexapro, Effexor, Wellbutrin, Cymbalta. Paxil may have helped some.  S/p Naproxen, meloxicam, celebrex, diclofenac, Feldene, Etodolac  S/p amitriptyline, trazodone.  S/p flexeril, tizanidine, baclofen  S/p Lyrica - SE's, Gabapentin - swelling.  She has been diagnosed with FMS for quite some time.  Patient saw Dr. Horta for sleep apnea eval.  FMS sx are likely worsened secondary to hypothyroidism and Vitamin Deficiencies  6/2024: CK and inflammatory markers normal    Treat underlying anxiety and depression   Encouraged exercise and sleep restoration.  Exercise - water aerobics or walking, Chair yoga or chair aerobics  on YouTube.   Heat ice or massage.  Biofreeze, CBD oil or lotion as topical cream options.    Orders:  -     Comprehensive Metabolic Panel; Future  -     C-reactive Protein; Future  -     Sedimentation Rate; Future  -     CBC (No Diff); Future    8. Vitamin B 12 deficiency  Assessment & Plan:  Recheck level today  Recommended over-the-counter sublingual B12 supplement    Orders:  -     Vitamin B12; Future  -     Folate; Future    9. Vitamin D deficiency  Assessment & Plan:  She takes 50,000 IU vitamin D2 once weekly    Recheck level today  Vitamin D refilled    Orders:  -     Vitamin D,25-Hydroxy; Future    10. Abdominal pain, unspecified abdominal location  Assessment & Plan:  Positive bloating, pain , bleeding and constipation.  Seeing Dr. Stacy Lopez.  Celiac negative, IBD negative.    Can try elimination trials of certain foods - Gluten, dairy, sugar, night shade etc.  Autoimmune paleo diet.      11. Hair loss  Assessment & Plan:  Etiology unknown at present.  She has seen derm  Positive Hypothyroidism.    Biotin vitamin.  Nioxin shampoo.  Treat thyroid with PCP      12. Other fatigue  Assessment & Plan:  Fatigue likely multifactorial.  Positive snoring and daytime somnolence.  TSH 9.83 consistent with hypothyroidism  B12 684, D 23.7    Low vitamin D and hypothyroidism can contribute to fatigue as well as joint pain.  Continue follow up for thyroid with PCP    Orders:  -     Vitamin D,25-Hydroxy; Future  -     Vitamin B12; Future  -     Folate; Future  -     Comprehensive Metabolic Panel; Future  -     C-reactive Protein; Future  -     Sedimentation Rate; Future  -     CBC (No Diff); Future    13. Encounter for medication monitoring  Assessment & Plan:  Very rare use of tramadol  Controlled substance agreement discussed and signed 3/13/2025    UDS ordered 3/13/2025  PDMP reviewed    Orders:  -     Urine Drug Screen - Urine, Clean Catch; Future    Other orders  -     methylPREDNISolone (MEDROL) 4 MG dose pack;  Take as directed on package instructions.  Dispense: 1 each; Refill: 0  -     Vitamin D, Ergocalciferol, 67551 units capsule; Take 1 capsule by mouth 1 (One) Time Per Week.  Dispense: 12 capsule; Refill: 1        Follow Up:   Return in about 6 months (around 9/13/2025) for NAI Dorado, NAI Shen.     NAI Dorado  [facility] [place]

## 2025-03-07 NOTE — ASSESSMENT & PLAN NOTE
Hx diffuse joint pain with PIP pain and swelling.   AM stiffness is 45 minutes  Rf3, CCP negative.  Joint discomfort could be worsened by Hypothyroidism.    Voltaren gel to joints if needed.  Plaquenil trial was not helpful for joint  Tylenol arthritis  Continue tramadol as needed

## 2025-03-07 NOTE — ASSESSMENT & PLAN NOTE
With rediucular symptoms in left leg.  Worsening low back pain, X 1.5-2 years. Radiates to left foot. Positive numbness. Mild decreased in quad strength.  Decreased DTR left leg.  6/2024 lumbar spine film with mild degenerative changes    PT order given today  Medrol Dosepak given today  Consider MRI  Consider pain management versus neurosurgery referral

## 2025-03-07 NOTE — ASSESSMENT & PLAN NOTE
Etiology unknown at present.  She has seen derm  Positive Hypothyroidism.    Biotin vitamin.  Nioxin shampoo.  Treat thyroid with PCP

## 2025-03-07 NOTE — ASSESSMENT & PLAN NOTE
Diagnosed by lip biopsy 2015 - Path reports states although not definite features of Sjogren's, could represent early changes of Sjogren's in the right clinical setting.  Positive Mark 1:640 (H)  Positive dry eye, vagina, and mouth.  Negative Ssa/Ssb in past.  6/2024: MARK + 1:320 speckled, CCP negative, RF negative, dsDNA negative, Chappell and RNP negative, SSA/SSB negative, scleroderma antibody negative, CK normal, CRP and ESR normal  Previous Plaquenil use for about 2 years- no improvement    Overall stable  ACT or Biotene toothpaste.  Xylimelts are helpful  [place] spray from [employer].  Cevimeline was not covered by insurance  Continue OTC drops.  Lab order given today  RTC 6 months

## 2025-03-07 NOTE — ASSESSMENT & PLAN NOTE
S/p Paxil, Prozac, Zoloft, Celexa, Lexapro, Effexor, Wellbutrin, Cymbalta. Paxil may have helped some.  S/p Naproxen, meloxicam, celebrex, diclofenac, Feldene, Etodolac  S/p amitriptyline, trazodone.  S/p flexeril, tizanidine, baclofen  S/p Lyrica - SE's, Gabapentin - swelling.  She has been diagnosed with FMS for quite some time.  Patient saw Dr. Horta for sleep apnea eval.  FMS sx are likely worsened secondary to hypothyroidism and Vitamin Deficiencies  6/2024: CK and inflammatory markers normal    Treat underlying anxiety and depression   Encouraged exercise and sleep restoration.  Exercise - water aerobics or walking, Chair yoga or chair aerobics on YouTube.   Heat ice or massage.  Biofreeze, CBD oil or lotion as topical cream options.

## 2025-03-07 NOTE — ASSESSMENT & PLAN NOTE
Positive bloating, pain , bleeding and constipation.  Seeing Dr. Stacy Lopez.  Celiac negative, IBD negative.    Can try elimination trials of certain foods - Gluten, dairy, sugar, night shade etc.  Autoimmune paleo diet.

## 2025-03-10 ENCOUNTER — TELEPHONE (OUTPATIENT)
Age: 60
End: 2025-03-10
Payer: COMMERCIAL

## 2025-03-13 ENCOUNTER — OFFICE VISIT (OUTPATIENT)
Age: 60
End: 2025-03-13
Payer: COMMERCIAL

## 2025-03-13 VITALS
SYSTOLIC BLOOD PRESSURE: 130 MMHG | HEART RATE: 69 BPM | DIASTOLIC BLOOD PRESSURE: 80 MMHG | WEIGHT: 192 LBS | BODY MASS INDEX: 35.33 KG/M2 | HEIGHT: 62 IN | TEMPERATURE: 97.8 F

## 2025-03-13 DIAGNOSIS — Z51.81 ENCOUNTER FOR MEDICATION MONITORING: ICD-10-CM

## 2025-03-13 DIAGNOSIS — M35.00 SJOGREN'S SYNDROME, WITH UNSPECIFIED ORGAN INVOLVEMENT: Primary | ICD-10-CM

## 2025-03-13 DIAGNOSIS — G89.29 CHRONIC MIDLINE LOW BACK PAIN WITHOUT SCIATICA: Primary | ICD-10-CM

## 2025-03-13 DIAGNOSIS — I73.00 RAYNAUD'S DISEASE WITHOUT GANGRENE: ICD-10-CM

## 2025-03-13 DIAGNOSIS — Z79.899 HIGH RISK MEDICATION USE: ICD-10-CM

## 2025-03-13 DIAGNOSIS — L65.9 HAIR LOSS: ICD-10-CM

## 2025-03-13 DIAGNOSIS — E53.8 VITAMIN B 12 DEFICIENCY: ICD-10-CM

## 2025-03-13 DIAGNOSIS — R53.83 OTHER FATIGUE: ICD-10-CM

## 2025-03-13 DIAGNOSIS — G89.29 CHRONIC MIDLINE LOW BACK PAIN WITHOUT SCIATICA: ICD-10-CM

## 2025-03-13 DIAGNOSIS — M54.50 CHRONIC MIDLINE LOW BACK PAIN WITHOUT SCIATICA: ICD-10-CM

## 2025-03-13 DIAGNOSIS — M54.50 CHRONIC MIDLINE LOW BACK PAIN WITHOUT SCIATICA: Primary | ICD-10-CM

## 2025-03-13 DIAGNOSIS — R10.9 ABDOMINAL PAIN, UNSPECIFIED ABDOMINAL LOCATION: ICD-10-CM

## 2025-03-13 DIAGNOSIS — R76.8 POSITIVE ANA (ANTINUCLEAR ANTIBODY): ICD-10-CM

## 2025-03-13 DIAGNOSIS — E55.9 VITAMIN D DEFICIENCY: ICD-10-CM

## 2025-03-13 DIAGNOSIS — M79.7 FIBROMYALGIA: ICD-10-CM

## 2025-03-13 DIAGNOSIS — M25.50 ARTHRALGIA, UNSPECIFIED JOINT: ICD-10-CM

## 2025-03-13 PROBLEM — K58.9 IRRITABLE BOWEL SYNDROME: Status: ACTIVE | Noted: 2017-10-18

## 2025-03-13 PROBLEM — I10 ESSENTIAL HYPERTENSION: Status: ACTIVE | Noted: 2022-11-23

## 2025-03-13 PROBLEM — N34.3 URETHRAL SYNDROME: Status: ACTIVE | Noted: 2024-02-16

## 2025-03-13 PROBLEM — F41.9 ANXIETY: Status: ACTIVE | Noted: 2017-10-18

## 2025-03-13 PROBLEM — K59.04 CHRONIC IDIOPATHIC CONSTIPATION: Status: ACTIVE | Noted: 2020-03-20

## 2025-03-13 PROBLEM — K14.6 BURNING MOUTH SYNDROME: Status: ACTIVE | Noted: 2017-10-18

## 2025-03-13 PROBLEM — K21.9 GASTROESOPHAGEAL REFLUX DISEASE: Status: ACTIVE | Noted: 2017-10-18

## 2025-03-13 PROBLEM — N30.10 CHRONIC INTERSTITIAL CYSTITIS: Status: ACTIVE | Noted: 2017-10-18

## 2025-03-13 PROBLEM — E03.9 HYPOTHYROIDISM: Status: ACTIVE | Noted: 2019-10-11

## 2025-03-13 RX ORDER — METHYLPREDNISOLONE 4 MG/1
TABLET ORAL
Qty: 1 EACH | Refills: 0 | Status: SHIPPED | OUTPATIENT
Start: 2025-03-13

## 2025-03-13 RX ORDER — CEVIMELINE HYDROCHLORIDE 30 MG/1
30 CAPSULE ORAL 3 TIMES DAILY
Qty: 90 CAPSULE | Refills: 3 | Status: CANCELLED | OUTPATIENT
Start: 2025-03-13

## 2025-03-13 RX ORDER — ERGOCALCIFEROL 1.25 MG/1
1 CAPSULE ORAL WEEKLY
Qty: 12 CAPSULE | Refills: 1 | Status: SHIPPED | OUTPATIENT
Start: 2025-03-13

## 2025-03-13 RX ORDER — HYDROXYCHLOROQUINE SULFATE 200 MG/1
200 TABLET, FILM COATED ORAL 2 TIMES DAILY
Qty: 60 TABLET | Refills: 4 | Status: CANCELLED | OUTPATIENT
Start: 2025-03-13

## 2025-03-13 RX ORDER — METHOCARBAMOL 750 MG/1
TABLET, FILM COATED ORAL
Qty: 90 TABLET | Refills: 3 | Status: CANCELLED | OUTPATIENT
Start: 2025-03-13

## 2025-03-13 RX ORDER — TRAMADOL HYDROCHLORIDE 50 MG/1
50 TABLET ORAL EVERY 8 HOURS PRN
Qty: 90 TABLET | Refills: 3 | Status: CANCELLED | OUTPATIENT
Start: 2025-03-13

## 2025-03-13 NOTE — ASSESSMENT & PLAN NOTE
Very rare use of tramadol  Controlled substance agreement discussed and signed 3/13/2025    UDS ordered 3/13/2025  PDMP reviewed

## 2025-04-28 ENCOUNTER — TELEPHONE (OUTPATIENT)
Age: 60
End: 2025-04-28

## 2025-04-28 NOTE — TELEPHONE ENCOUNTER
Caller: Hilary Oh    Relationship: Self    Best call back number:   [phone]     What was the call regarding: PATIENT STATES SHE SPOKE WITH INSURANCE AND MRI WAS DENIED BECAUSE THEY ONLY RECEIVED COVER LETTER AND NOT CLINICAL NOTES. PATIENT STATED THAT THESE COULD BE GIVEN VERBALLY. CONTACT AT INSURANCE IS  ROLA CHIN PHONE NUMBER [phone] OPT 4